# Patient Record
Sex: FEMALE | Race: WHITE | URBAN - METROPOLITAN AREA
[De-identification: names, ages, dates, MRNs, and addresses within clinical notes are randomized per-mention and may not be internally consistent; named-entity substitution may affect disease eponyms.]

---

## 2017-10-06 ENCOUNTER — OFFICE VISIT (OUTPATIENT)
Dept: FAMILY MEDICINE | Facility: CLINIC | Age: 37
End: 2017-10-06
Payer: COMMERCIAL

## 2017-10-06 VITALS
OXYGEN SATURATION: 99 % | SYSTOLIC BLOOD PRESSURE: 106 MMHG | DIASTOLIC BLOOD PRESSURE: 62 MMHG | BODY MASS INDEX: 23.98 KG/M2 | WEIGHT: 127 LBS | HEART RATE: 62 BPM | HEIGHT: 61 IN | TEMPERATURE: 98 F

## 2017-10-06 DIAGNOSIS — G89.29 CHRONIC PAIN OF BOTH SHOULDERS: Primary | ICD-10-CM

## 2017-10-06 DIAGNOSIS — M25.562 ACUTE PAIN OF BOTH KNEES: ICD-10-CM

## 2017-10-06 DIAGNOSIS — M25.561 ACUTE PAIN OF BOTH KNEES: ICD-10-CM

## 2017-10-06 DIAGNOSIS — M25.512 CHRONIC PAIN OF BOTH SHOULDERS: Primary | ICD-10-CM

## 2017-10-06 DIAGNOSIS — M25.511 CHRONIC PAIN OF BOTH SHOULDERS: Primary | ICD-10-CM

## 2017-10-06 PROCEDURE — 99203 OFFICE O/P NEW LOW 30 MIN: CPT | Performed by: PHYSICIAN ASSISTANT

## 2017-10-06 NOTE — PROGRESS NOTES
SUBJECTIVE:   Beatrice Cadena is a 37 year old female who presents to clinic today for the following health issues:    Accompanied by .    Joint Pain    Onset: for about three weeks    Description:   Location: bilateral knee pain  Character: feels like nails - notices around anterior infrapatellar tendon and around her kneecap    Intensity: at rest rates pain as 3/10. Will increase to 6/10 - had this more at the beginning of her exercise routine though and now has improved some.    Progression of Symptoms: same    Accompanying Signs & Symptoms:  Other symptoms: none - no swelling or ecchymosis.    History:   Previous similar pain: no       Precipitating factors:   Trauma or overuse: no trauma. Did start a new exercise routine about 1 month ago where she spent a whole week doing new exercises. Then due to her knee pain she stopped exercising for 2 weeks to see if it would help. Was doing squats and crunches.    Alleviating factors:  Improved by: nothing    Therapies Tried and outcome: none    2) Also reports she has had shoulder pain >1 yr and wonders if this would be related to her knee pain. Sometimes when she wakes up they hurt. Initially bothered her only once per month or every 2 weeks. Then now her shoulders hurt almost all the time over the past 3 months. Rates pain 8/10 sometimes when it wakens her from sleep. Is about 6/10 otherwise. No shoulder injuries. Insidious onset. R-handed. L usually bothers her more. 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 A lot of repetitive activities like this and vacuuming. No radicular symptoms.     Fhx: no rheumatoid arthritis or lupus      Problem list and histories reviewed & adjusted, as indicated.  Additional history: as documented    Patient Active Problem List   Diagnosis     CARDIOVASCULAR SCREENING; LDL GOAL LESS THAN 160     Past Surgical History:   Procedure Laterality Date     C EYE SURG ANT SGMT PROC  "UNLISTED       C I&D,THYROGLOSSAL CYST,INFECTED         Social History   Substance Use Topics     Smoking status: Never Smoker     Smokeless tobacco: Never Used     Alcohol use Yes     History reviewed. No pertinent family history.          Reviewed and updated as needed this visit by clinical staffTobacco  Allergies  Meds  Med Hx  Surg Hx  Fam Hx  Soc Hx      Reviewed and updated as needed this visit by Provider  Tobacco  Allergies  Meds  Med Hx  Surg Hx  Fam Hx  Soc Hx        ROS:  Constitutional, MSK, integumentary, neuro systems are negative, except as otherwise noted.      OBJECTIVE:   /62  Pulse 62  Temp 98  F (36.7  C) (Oral)  Ht 5' 0.5\" (1.537 m)  Wt 127 lb (57.6 kg)  SpO2 99%  Breastfeeding? No  BMI 24.39 kg/m2  Body mass index is 24.39 kg/(m^2).  GENERAL: healthy, alert and no distress  MS: ambulates with non-antalgic gait. Can perform deep knee squat with a little bit of discomfort L>R. No patellar, medial/lateral joint line or patellar grind pain. Negative Westley and anterior drawer testing excluding Westley caused a little medial pain on the L.   Starts to have L shoulder pain in flexion and abduction above the horizontal. Draws over acromion/deltoid region as area where pain occurs. FROM of both, but has pain with L push off testing and external rotation (a little). 5/5 strength to resistance testing of UE. Negative empty can testing. Equal and symmetric  strength.  Neuro: normal 2+ UE reflexes.     Diagnostic Test Results:  none     ASSESSMENT/PLAN:       ICD-10-CM    1. Chronic pain of both shoulders M25.511 PHYSICAL THERAPY REFERRAL    G89.29     M25.512    2. Acute pain of both knees M25.561 PHYSICAL THERAPY REFERRAL    M25.562    Pt declines further labs as noted in pt instructions below.  Did mention at the end of our appt after I had printed AVS she had shoulder imaging done by the chiropractor in the past and was told she may have some early arthritis.   Will " still continue with plan as below given good ROM and strength at this time, but certainly if worsening or not improving we can always consider repeat imaging or ortho consult.    A total of 40 minutes was spent with the patient today, with greater than 50% of the visit involving counseling and coordination of care regarding concerns of bilateral knee and shoulder pain. Visit complicated by use of .     See Patient Instructions  Patient Instructions   History/exam most suggestive of over-use type of problems with shoulder tendonitis and possible patellofemoral syndrome of her knees.  Can always treat with rest, ice and anti-inflammatories. Risks reviewed.  Would also likely benefit from further treatment with physical therapy.  Discussed potential for other autoimmune or rheumatoid arthritis given multiple joint involvement, but this seems less likely given area of involvement.  Can always consider labs and/or imaging if she doesn't improve or has on-going concerns.    Electronically Signed By: Avis De León PA-C

## 2017-10-06 NOTE — NURSING NOTE
"Chief Complaint   Patient presents with     Knee Pain       Initial Pulse 62  Temp 98  F (36.7  C) (Oral)  Ht 5' 0.5\" (1.537 m)  Wt 127 lb (57.6 kg)  SpO2 99%  Breastfeeding? No  BMI 24.39 kg/m2 Estimated body mass index is 24.39 kg/(m^2) as calculated from the following:    Height as of this encounter: 5' 0.5\" (1.537 m).    Weight as of this encounter: 127 lb (57.6 kg).  Medication Reconciliation: complete  "

## 2017-10-06 NOTE — PATIENT INSTRUCTIONS
History/exam most suggestive of over-use type of problems with shoulder tendonitis and possible patellofemoral syndrome of her knees.  Can always treat with rest, ice and anti-inflammatories. Risks reviewed.  Would also likely benefit from further treatment with physical therapy.  Discussed potential for other autoimmune or rheumatoid arthritis given multiple joint involvement, but this seems less likely given area of involvement.  Can always consider labs and/or imaging if she doesn't improve or has on-going concerns.    Electronically Signed By: Avis De León PA-C

## 2017-10-08 ENCOUNTER — HEALTH MAINTENANCE LETTER (OUTPATIENT)
Age: 37
End: 2017-10-08

## 2018-03-02 ENCOUNTER — OFFICE VISIT (OUTPATIENT)
Dept: FAMILY MEDICINE | Facility: CLINIC | Age: 38
End: 2018-03-02
Payer: COMMERCIAL

## 2018-03-02 VITALS
HEIGHT: 61 IN | TEMPERATURE: 98.6 F | HEART RATE: 58 BPM | BODY MASS INDEX: 24.92 KG/M2 | WEIGHT: 132 LBS | OXYGEN SATURATION: 100 % | DIASTOLIC BLOOD PRESSURE: 64 MMHG | SYSTOLIC BLOOD PRESSURE: 90 MMHG

## 2018-03-02 DIAGNOSIS — H10.33 ACUTE CONJUNCTIVITIS OF BOTH EYES, UNSPECIFIED ACUTE CONJUNCTIVITIS TYPE: Primary | ICD-10-CM

## 2018-03-02 PROCEDURE — 99213 OFFICE O/P EST LOW 20 MIN: CPT | Performed by: FAMILY MEDICINE

## 2018-03-02 RX ORDER — POLYMYXIN B SULFATE AND TRIMETHOPRIM 1; 10000 MG/ML; [USP'U]/ML
1 SOLUTION OPHTHALMIC 4 TIMES DAILY
Qty: 2 ML | Refills: 0 | Status: SHIPPED | OUTPATIENT
Start: 2018-03-02 | End: 2018-03-09

## 2018-03-02 NOTE — NURSING NOTE
"Chief Complaint   Patient presents with     Eye Problem       Initial BP 90/64  Pulse 58  Temp 98.6  F (37  C) (Oral)  Ht 5' 0.5\" (1.537 m)  Wt 132 lb (59.9 kg)  LMP 02/28/2018  SpO2 100%  BMI 25.36 kg/m2 Estimated body mass index is 25.36 kg/(m^2) as calculated from the following:    Height as of this encounter: 5' 0.5\" (1.537 m).    Weight as of this encounter: 132 lb (59.9 kg).  Medication Reconciliation: complete   Rochelle Palma Certified Medical Assistant    "

## 2018-03-02 NOTE — PROGRESS NOTES
"  SUBJECTIVE:   Beatrice Cadena is a 37 year old female who presents to clinic today for the following health issues:      Eye(s) Problem  Onset: X1 Week     Description:   Location: bilateral - very itchy   Pain: YES- little   Redness: YES    Accompanying Signs & Symptoms:  Discharge/mattering: YES  Swelling: YES  Visual changes: no   Fever: no  Nasal Congestion: no  Bothered by bright lights: YES- little     History:   Trauma: no   Foreign body exposure: no     Precipitating factors:   Wearing contacts: no    Alleviating factors:  Improved by: nothing     Therapies Tried and outcome: water and eye drops for allergies - maybe helped a little bit.       Problem list and histories reviewed & adjusted, as indicated.  Additional history: as documented    Patient Active Problem List   Diagnosis     CARDIOVASCULAR SCREENING; LDL GOAL LESS THAN 160     Past Surgical History:   Procedure Laterality Date     C EYE SURG ANT SGMT PROC UNLISTED       C I&D,THYROGLOSSAL CYST,INFECTED         Social History   Substance Use Topics     Smoking status: Never Smoker     Smokeless tobacco: Never Used     Alcohol use Yes     History reviewed. No pertinent family history.        Reviewed and updated as needed this visit by clinical staff  Tobacco  Allergies  Meds  Problems  Med Hx  Surg Hx  Fam Hx  Soc Hx        Reviewed and updated as needed this visit by Provider  Allergies  Meds  Problems         ROS:  Constitutional, HEENT, cardiovascular, pulmonary, gi and gu systems are negative, except as otherwise noted.    OBJECTIVE:     BP 90/64  Pulse 58  Temp 98.6  F (37  C) (Oral)  Ht 5' 0.5\" (1.537 m)  Wt 132 lb (59.9 kg)  LMP 02/28/2018  SpO2 100%  BMI 25.36 kg/m2  Body mass index is 25.36 kg/(m^2).  GENERAL: healthy, alert and no distress  EYES: thin discharge seen in eyelashes; pink conjunctiva    Diagnostic Test Results:  none     ASSESSMENT/PLAN:   1. Acute conjunctivitis of both eyes, unspecified acute " conjunctivitis type: will treat for acute conjunctivitis with Polytrim eye drops. Return to clinic if symptoms not improving.  - trimethoprim-polymyxin b (POLYTRIM) ophthalmic solution; Place 1 drop into both eyes 4 times daily for 7 days  Dispense: 2 mL; Refill: 0    Vipul Lamb DO  [facility]

## 2018-03-02 NOTE — MR AVS SNAPSHOT
"              After Visit Summary   3/2/2018    Beatrice Cadena    MRN: [id number]           Patient Information     Date Of Birth          1980        Visit Information        Provider Department      3/2/2018 1:05 PM Vipul Lamb, DO; [facility]        Today's Diagnoses     Acute conjunctivitis of both eyes, unspecified acute conjunctivitis type    -  1       Follow-ups after your visit        Follow-up notes from your care team     Return in about 1 week (around 3/9/2018), or if symptoms worsen or fail to improve.      Who to contact     If you have questions or need follow up information about today's clinic visit or your schedule please contact [facility] directly at [phone].  Normal or non-critical lab and imaging results will be communicated to you by MyChart, letter or phone within 4 business days after the clinic has received the results. If you do not hear from us within 7 days, please contact the clinic through MyChart or phone. If you have a critical or abnormal lab result, we will notify you by phone as soon as possible.  Submit refill requests through [employer] or call your pharmacy and they will forward the refill request to us. Please allow 3 business days for your refill to be completed.          Additional Information About Your Visit        MyChart Information     [employer] lets you send messages to your doctor, view your test results, renew your prescriptions, schedule appointments and more. To sign up, go to [email or web address] . Click on \"Log in\" on the left side of the screen, which will take you to the Welcome page. Then click on \"Sign up Now\" on the right side of the page.     You will be asked to enter the access code listed below, as well as some personal information. Please follow the directions to create your username and password.     Your access code is: [email or web address]  Expires: 5/31/2018  1:50 PM     Your access code " "will  in 90 days. If you need help or a new code, please call your [place] clinic or [phone].        Care EveryWhere ID     This is your Care EveryWhere ID. This could be used by other organizations to access your [place] medical records  [id number]        Your Vitals Were     Pulse Temperature Height Last Period Pulse Oximetry BMI (Body Mass Index)    58 98.6  F (37  C) (Oral) 5' 0.5\" (1.537 m) 2018 100% 25.36 kg/m2       Blood Pressure from Last 3 Encounters:   18 90/64   10/06/17 106/62    Weight from Last 3 Encounters:   18 132 lb (59.9 kg)   10/06/17 127 lb (57.6 kg)              Today, you had the following     No orders found for display         Today's Medication Changes          These changes are accurate as of 3/2/18  1:50 PM.  If you have any questions, ask your nurse or doctor.               Start taking these medicines.        Dose/Directions    trimethoprim-polymyxin b ophthalmic solution   Commonly known as:  POLYTRIM   Used for:  Acute conjunctivitis of both eyes, unspecified acute conjunctivitis type   Started by:  Vipul Lamb,         Dose:  1 drop   Place 1 drop into both eyes 4 times daily for 7 days   Quantity:  2 mL   Refills:  0            Where to get your medicines      These medications were sent to [facility] [address] [place] [address] AT [facility] [address] [address]    Hours:  24-hours Phone:  [phone]     trimethoprim-polymyxin b ophthalmic solution                Primary Care Provider Office Phone # Fax #    Gurpreet Genao [phone]       [address] [facility] [address]        Equal Access to Services     QUIRINO KEARNEY AH: Tamera Keller, alee davis, qaana kaalmada trenada, bishnu saleh. So [facility] [phone].    ATENCIÓN: Si habla español, tiene a cox disposición servicios gratuitos de " verónica lingüísticsahil. Michaela al [phone].    We comply with applicable federal civil rights laws and [place] laws. We do not discriminate on the basis of race, color, national origin, age, disability, sex, sexual orientation, or gender identity.            Thank you!     Thank you for choosing [facility]  for your care. Our goal is always to provide you with excellent care. Hearing back from our patients is one way we can continue to improve our services. Please take a few minutes to complete the written survey that you may receive in the mail after your visit with us. Thank you!             Your Updated Medication List - Protect others around you: Learn how to safely use, store and throw away your medicines at www.disposemymeds.org.          This list is accurate as of 3/2/18  1:50 PM.  Always use your most recent med list.                   Brand Name Dispense Instructions for use Diagnosis    trimethoprim-polymyxin b ophthalmic solution    POLYTRIM    2 mL    Place 1 drop into both eyes 4 times daily for 7 days    Acute conjunctivitis of both eyes, unspecified acute conjunctivitis type

## 2018-03-12 ENCOUNTER — OFFICE VISIT (OUTPATIENT)
Dept: PODIATRY | Facility: CLINIC | Age: 38
End: 2018-03-12
Payer: COMMERCIAL

## 2018-03-12 VITALS
DIASTOLIC BLOOD PRESSURE: 64 MMHG | WEIGHT: 132 LBS | HEIGHT: 61 IN | SYSTOLIC BLOOD PRESSURE: 96 MMHG | BODY MASS INDEX: 24.92 KG/M2

## 2018-03-12 DIAGNOSIS — B35.3 TINEA PEDIS OF BOTH FEET: ICD-10-CM

## 2018-03-12 DIAGNOSIS — B35.1 ONYCHOMYCOSIS OF TOENAIL: Primary | ICD-10-CM

## 2018-03-12 LAB
ALBUMIN SERPL-MCNC: 3.7 G/DL (ref 3.4–5)
ALP SERPL-CCNC: 54 U/L (ref 40–150)
ALT SERPL W P-5'-P-CCNC: 21 U/L (ref 0–50)
AST SERPL W P-5'-P-CCNC: 25 U/L (ref 0–45)
BILIRUB DIRECT SERPL-MCNC: <0.1 MG/DL (ref 0–0.2)
BILIRUB SERPL-MCNC: 0.3 MG/DL (ref 0.2–1.3)
PROT SERPL-MCNC: 7.4 G/DL (ref 6.8–8.8)

## 2018-03-12 PROCEDURE — 80076 HEPATIC FUNCTION PANEL: CPT | Performed by: PODIATRIST

## 2018-03-12 PROCEDURE — 99203 OFFICE O/P NEW LOW 30 MIN: CPT | Performed by: PODIATRIST

## 2018-03-12 PROCEDURE — 36415 COLL VENOUS BLD VENIPUNCTURE: CPT | Performed by: PODIATRIST

## 2018-03-12 RX ORDER — TERBINAFINE HYDROCHLORIDE 250 MG/1
250 TABLET ORAL DAILY
Qty: 90 TABLET | Refills: 0 | Status: SHIPPED | OUTPATIENT
Start: 2018-03-12 | End: 2018-06-11

## 2018-03-12 RX ORDER — KETOCONAZOLE 20 MG/G
CREAM TOPICAL DAILY
Qty: 60 G | Refills: 2 | Status: SHIPPED | OUTPATIENT
Start: 2018-03-12 | End: 2018-06-21

## 2018-03-12 NOTE — LETTER
"    3/12/2018         RE: Beatrice Cadena  [address] [place] [id number]        Dear Colleague,    Thank you for referring your patient, Beatrice Cadena, to the [facility] ([facility] Please see a copy of my visit note below.    PATIENT HISTORY:  Beatrice Cadena is a 38 year old female who presents to clinic for thickened darkened toenails. Here with . Notes that it has been going on for 5 years. Will get red patches that itch on the foot too. Denies pain. Would like to knw what can be done to get rid of the nail fungus.     Review of Systems:  Patient denies fever, chills, rash, wound, stiffness, limping, numbness, weakness, heart burn, blood in stool, chest pain with activity, calf pain when walking, shortness of breath with activity, chronic cough, easy bleeding/bruising, swelling of ankles, excessive thirst, fatigue, depression, anxiety.       PAST MEDICAL HISTORY: No past medical history on file.     PAST SURGICAL HISTORY:   Past Surgical History:   Procedure Laterality Date     C EYE SURG ANT SGMT PROC UNLISTED       C I&D,THYROGLOSSAL CYST,INFECTED          MEDICATIONS: No current outpatient prescriptions on file.     ALLERGIES:  No Known Allergies     SOCIAL HISTORY:   Social History     Social History     Marital status:      Spouse name: N/A     Number of children: N/A     Years of education: N/A     Occupational History     Not on file.     Social History Main Topics     Smoking status: Never Smoker     Smokeless tobacco: Never Used     Alcohol use Yes     Drug use: No     Sexual activity: Yes     Partners: Male     Other Topics Concern     Not on file     Social History Narrative        FAMILY HISTORY: No family history on file.     EXAM:Vitals:BP 96/64  Ht 5' 0.5\" (1.537 m)  Wt 132 lb (59.9 kg)  LMP 02/28/2018  BMI 25.36 kg/m2    General appearance: Patient is alert and fully cooperative with history & exam.  No sign of distress is " noted during the visit.     Psychiatric: Affect is pleasant & appropriate.  Patient appears motivated to improve health.     Respiratory: Breathing is regular & unlabored while sitting.     HEENT: Hearing is intact to spoken word.  Speech is clear.  No gross evidence of visual impairment that would impact ambulation.     Dermatologic: Skin is intact to both lower extremities without significant lesions, rash or abrasion.  No paronychia or evidence of soft tissue infection is noted.     Vascular: DP & PT pulses are intact & regular bilaterally.  No significant edema or varicosities noted.  CFT and skin temperature is normal to both lower extremities.     Neurologic: Lower extremity sensation is intact to light touch.  No evidence of weakness or contracture in the lower extremities.  No evidence of neuropathy.     Musculoskeletal: Patient is ambulatory without assistive device or brace.  No gross ankle deformity noted.  No foot or ankle joint effusion is noted.     ASSESSMENT:    Onychomycosis of toenail  Tinea pedis of both feet     PLAN:  Reviewed patient's chart in epic. Discussed causes and treatments of nail fungus.  Explained that even if a culture comes back negative, a patient could still have nail fungus.  Discussed treatment options with patient and explained that there isn't one treatment that is 100% effective.  Discussed oral lamisil which is the most effective at about 70% but which can have liver effects.  Explained that if she wanted to try this that she would need serial blood draws to test her liver function.  Discussed over the counter antifungal creams.  Explained that these are about 50% effective and need to be applied twice a day for about 3-4 months.  Also talked about prescription penlac which is a nail laquer.  Again this is also only 50% effective.  Also discussed that if there was damage to the nail and the nail is now dystrophic that non of the above is going to change the nail.  If there  was damage, there is note anything that can be done for the nail to correct it.  Discussed that if it becomes painful, we can remove the nail in clinic.        Talked about athletes foot. Recommend oral and topical antifungals. Will do liver tests but patient is not currently on medications.           Lyly Tineo DPM, Podiatry/Foot and Ankle Surgery    Weight management plan: Patient was referred to their PCP to discuss a diet and exercise plan.      Again, thank you for allowing me to participate in the care of your patient.        Sincerely,        Lyly Tineo DPM, Podiatry/Foot and Ankle Surgery

## 2018-03-12 NOTE — MR AVS SNAPSHOT
After Visit Summary   3/12/2018    Beatrice Cadena    MRN: [id number]           Patient Information     Date Of Birth          1980        Visit Information        Provider Department      3/12/2018 11:00 AM Lyly Tineo DPM, Podiatry/Foot and Ankle Surgery; [employer] [facility]        Care Instructions    Thank you for choosing [facility]!    DR. TINEO'S CLINIC LOCATIONS:   MONDAY AM - [place] TUESDAY - [place]   [address] [address] [address] [place] [address] [place] [address]   [phone] / [phone] / [phone]       WEDNESDAY - [place] FRIDAY PM - [place]   [address]   [place] [address] [place], [place] [address] [phone] / [phone]       SCHEDULE SURGERY: [phone]    APPOINTMENTS: [phone]    BILLING QUESTIONS: [phone]      NAIL FUNGUS / ONYCHOMYCOSIS   Nail fungus is not a hygiene problem and will not likely lead to significant medical   problems. The nails may get thick causing pain and possibly local skin infection.   Treatments include debridement (trimming), oral antifungals, topical antifungals and complete removal of the nail. Most fungal nails are not treated.   Topicals such as tea tree oil can be helpful for surface fungus and may, at best, limit   progression. Over the counter creams (such as Lamisil) can also be used however, their effectiveness is also quite low.  Topical treatment with Pen lac is expensive and often not covered by insurance. Pen lac has an approximate 8% success rate. Topical therapy recommendations is to apply twice a day for at least 3-4 months as it takes 9 months for new nail to grow out.    Experts suggest soaking your feet for 15 to 20 minutes in a mixture of 1 cup vinegar to 4 cups warm water. Be sure to rinse well and pat your feet dry when you're  done. You can soak your feet like this daily. But if your skin becomes irritated, try soaking only two to three times a week. Vicks VapoRub, as with vinegar, there have been no controlled clinical trials to assess the effectiveness of Vicks VapoRub on nail fungus, but there have been numerous anecdotal reports that it works. There's no consensus on how often to apply this product, so check with your doctor before using it on your nails.     Oral therapies include Sporanox and Lamisil. Oral therapies are also expensive and not very effective. Side effects such as liver disease are the main concern. Return of fungus is common even if the treatment worked.     Other Tips:  - Penlac nail medication apply daily x 4 months; remove old polish first day of each week  - Antifungal cream/powder (Zeasorb) - apply daily to feet and shoes x 2 months  - Clean shoes with Lysol or in washing machine every few weeks  - Rotate shoe gear; give them 24 hours to dry out between days wearing them  - Clean pair of socks in morning, clean pair in afternoon if your feet sweat  - Shower shoes used in public showers/pools  ATHLETE'S FOOT (TINEA PEDIS)  It is a rash that is caused by a fungus (most commonly Dermatophytes) in the outer layer of skin on the foot or in the nails. It can occur between the toes or on the instep and heel areas of the feet. Fungus will grow in warm and moist environments. The fungus that causes athlete's foot is contagious and you can get it from touching someone who has it of walking around bare foot around swimming pools, gyms, saunas, communal baths and showers, fitness centers or locker rooms.     SYMPTOMS  The symptoms of athlete's foot are numerous and include; itching, burning or stinging between the toes or on the soles of the feet, blisters, cracked and peeling skin, excessive dryness or excessive scaling on the bottom or sides of the feet, redness and softness with breaking down of the skin, especially  between the toes, foot odor and thick, crumbly nails. Older males or people who live in warm humid environments are more prone to get it but it can develop at any age.    TREATMENT OPTIONS  Typically it can be treated with antifungal creams, lotions, ointments, sprays, or gels.  Many are available over the counter, some are prescription. It is important that you use them long enough, typically 4 weeks, to clear the rash. If an infection is particularly bad, your provider may prescribe oral medication. It is important that you follow the directions for any medication carefully to prevent recurrence of the rash.    HOME TREATMENT  1.  Keep feet clean and dry  2.  Dry between your toes any time your feet become wet  3.  Wear socks that are made of cotton, wool, or fibers designed to wick moisture away  from skin. Nylon, lycra, and spandex tend to trap moisture.  4.  If you have blistering, you may soak your feet in Burow's solution (aluminum acetate is active ingredient. Domeboro is a brand sold at [employer]). This is available over the counter.  5.  Treat shows with antifungal powder  6.  Tea Tree oil may help reduce symptoms but does not cure the rash.    PREVENTION  1.  Change socks or stockings regularly.  2.  Use talcum powder on your feet if they sweat a lot.  3.  Do not borrow shoes.  4.  Let shoes dry out for 24 hours before wearing them again.  5.  Treat shoes with fungal powder  6.  Wear shoes that are well ventilated such as leather shoes or sandals.  Avoid shoes  made of synthetic materials such as vinyl or rubber.  7.  Wear water proof sandals when you are in public areas such as locker rooms, pools, communal baths, showers, saunas, and fitness centers.    FYI: Call or seek medical attention IMMEDIATELY if:  - You develop a fever over 100.4F for more than 24 hrs.  - There is a discharge of pus from infected areas.  - Red streaks develop from the affected areas  - Increase in redness, warmth, pain,  swelling, or tenderness in the affected areas.      Body Mass Index (BMI)  Many things can cause foot and ankle problems. Foot structure, activity level, foot mechanics and injuries are common causes of pain.  One very important issue that often goes unmentioned, is body weight. Extra weight can cause increased stress on muscles, ligaments, bones and tendons.  Sometimes just a few extra pounds is all it takes to put one over her/his threshold. Without reducing that stress, it can be difficult to alleviate pain. Some people are uncomfortable addressing this issue, but we feel it is important for you to think about it. As Foot &  Ankle specialists, our job is addressing the lower extremity problem and possible causes. Regarding extra body weight, we encourage patients to discuss diet and weight management plans with their primary care doctors. It is this team approach that gives you the best opportunity for pain relief and getting you back on your feet.                  Follow-ups after your visit        Who to contact     If you have questions or need follow up information about today's clinic visit or your schedule please contact [facility] [place] directly at [phone].  Normal or non-critical lab and imaging results will be communicated to you by Babyoyehart, letter or phone within 4 business days after the clinic has received the results. If you do not hear from us within 7 days, please contact the clinic through Convercentt or phone. If you have a critical or abnormal lab result, we will notify you by phone as soon as possible.  Submit refill requests through Nurigene or call your pharmacy and they will forward the refill request to us. Please allow 3 business days for your refill to be completed.          Additional Information About Your Visit        Nurigene Information     Nurigene lets you send messages to your doctor, view your test results, renew your prescriptions, schedule appointments and more. To sign  "up, go to [email or web address] . Click on \"Log in\" on the left side of the screen, which will take you to the Welcome page. Then click on \"Sign up Now\" on the right side of the page.     You will be asked to enter the access code listed below, as well as some personal information. Please follow the directions to create your username and password.     Your access code is: [email or web address]  Expires: 2018  2:50 PM     Your access code will  in 90 days. If you need help or a new code, please call your [facility] clinic or [phone].        Care EveryWhere ID     This is your Care EveryWhere ID. This could be used by other organizations to access your [facility] medical records  [id number]        Your Vitals Were     Height Last Period BMI (Body Mass Index)             5' 0.5\" (1.537 m) 2018 25.36 kg/m2          Blood Pressure from Last 3 Encounters:   18 96/64   18 90/64   10/06/17 106/62    Weight from Last 3 Encounters:   18 132 lb (59.9 kg)   18 132 lb (59.9 kg)   10/06/17 127 lb (57.6 kg)              Today, you had the following     No orders found for display       Primary Care Provider Fax #    Physician No Ref-Primary [phone]       No address on file        Equal Access to Services     [facility] CAIO : Hadii russell suttono Sosylvesterali, waaxda luqadaha, qaybta kaalmada adeegyada, bishnu vences . So [facility] [phone].    ATENCIÓN: Si habla español, tiene a cox disposición servicios gratuitos de asistencia lingüística. Llame al [phone].    We comply with applicable federal civil rights laws and [place] laws. We do not discriminate on the basis of race, color, national origin, age, disability, sex, sexual orientation, or gender identity.            Thank you!     Thank you for choosing [facility] [place]  for your care. Our goal is always to provide you with excellent care. Hearing back from our patients is one way we can continue to " improve our services. Please take a few minutes to complete the written survey that you may receive in the mail after your visit with us. Thank you!             Your Updated Medication List - Protect others around you: Learn how to safely use, store and throw away your medicines at www.disposemymeds.org.      Notice  As of 3/12/2018 11:44 AM    You have not been prescribed any medications.

## 2018-03-12 NOTE — NURSING NOTE
"Chief Complaint   Patient presents with     Toenail     pt worried most nails are discolored and getting thivker        Initial BP 96/64  Ht 5' 0.5\" (1.537 m)  Wt 132 lb (59.9 kg)  LMP 02/28/2018  BMI 25.36 kg/m2 Estimated body mass index is 25.36 kg/(m^2) as calculated from the following:    Height as of this encounter: 5' 0.5\" (1.537 m).    Weight as of this encounter: 132 lb (59.9 kg).  Medication Reconciliation: complete   Al Luz MA      "

## 2018-03-12 NOTE — PROGRESS NOTES
"PATIENT HISTORY:  Beatrice Cadena is a 38 year old female who presents to clinic for thickened darkened toenails. Here with . Notes that it has been going on for 5 years. Will get red patches that itch on the foot too. Denies pain. Would like to knw what can be done to get rid of the nail fungus.     Review of Systems:  Patient denies fever, chills, rash, wound, stiffness, limping, numbness, weakness, heart burn, blood in stool, chest pain with activity, calf pain when walking, shortness of breath with activity, chronic cough, easy bleeding/bruising, swelling of ankles, excessive thirst, fatigue, depression, anxiety.       PAST MEDICAL HISTORY: No past medical history on file.     PAST SURGICAL HISTORY:   Past Surgical History:   Procedure Laterality Date     C EYE SURG ANT SGMT PROC UNLISTED       C I&D,THYROGLOSSAL CYST,INFECTED          MEDICATIONS: No current outpatient prescriptions on file.     ALLERGIES:  No Known Allergies     SOCIAL HISTORY:   Social History     Social History     Marital status:      Spouse name: N/A     Number of children: N/A     Years of education: N/A     Occupational History     Not on file.     Social History Main Topics     Smoking status: Never Smoker     Smokeless tobacco: Never Used     Alcohol use Yes     Drug use: No     Sexual activity: Yes     Partners: Male     Other Topics Concern     Not on file     Social History Narrative        FAMILY HISTORY: No family history on file.     EXAM:Vitals:BP 96/64  Ht 5' 0.5\" (1.537 m)  Wt 132 lb (59.9 kg)  LMP 02/28/2018  BMI 25.36 kg/m2    General appearance: Patient is alert and fully cooperative with history & exam.  No sign of distress is noted during the visit.     Psychiatric: Affect is pleasant & appropriate.  Patient appears motivated to improve health.     Respiratory: Breathing is regular & unlabored while sitting.     HEENT: Hearing is intact to spoken word.  Speech is clear.  No gross evidence " of visual impairment that would impact ambulation.     Dermatologic: Skin is intact to both lower extremities without significant lesions, rash or abrasion.  No paronychia or evidence of soft tissue infection is noted.     Vascular: DP & PT pulses are intact & regular bilaterally.  No significant edema or varicosities noted.  CFT and skin temperature is normal to both lower extremities.     Neurologic: Lower extremity sensation is intact to light touch.  No evidence of weakness or contracture in the lower extremities.  No evidence of neuropathy.     Musculoskeletal: Patient is ambulatory without assistive device or brace.  No gross ankle deformity noted.  No foot or ankle joint effusion is noted.     ASSESSMENT:    Onychomycosis of toenail  Tinea pedis of both feet     PLAN:  Reviewed patient's chart in epic. Discussed causes and treatments of nail fungus.  Explained that even if a culture comes back negative, a patient could still have nail fungus.  Discussed treatment options with patient and explained that there isn't one treatment that is 100% effective.  Discussed oral lamisil which is the most effective at about 70% but which can have liver effects.  Explained that if she wanted to try this that she would need serial blood draws to test her liver function.  Discussed over the counter antifungal creams.  Explained that these are about 50% effective and need to be applied twice a day for about 3-4 months.  Also talked about prescription penlac which is a nail laquer.  Again this is also only 50% effective.  Also discussed that if there was damage to the nail and the nail is now dystrophic that non of the above is going to change the nail.  If there was damage, there is note anything that can be done for the nail to correct it.  Discussed that if it becomes painful, we can remove the nail in clinic.        Talked about athletes foot. Recommend oral and topical antifungals. Will do liver tests but patient is not  currently on medications.           Lyly Tineo DPM, Podiatry/Foot and Ankle Surgery    Weight management plan: Patient was referred to their PCP to discuss a diet and exercise plan.

## 2018-03-12 NOTE — PATIENT INSTRUCTIONS
Thank you for choosing [facility]!    DR. GATES'S CLINIC LOCATIONS:   MONDAY AM - [place] TUESDAY - [place]   [address] [address] [address] [address] [place], [place] [id number] [phone] / [phone]       WEDNESDAY - [place] FRIDAY PM - [place]   [address]   [address] [address] [place] [address] [phone] / [phone]       SCHEDULE SURGERY: [phone]    APPOINTMENTS: [phone]    BILLING QUESTIONS: [phone]      NAIL FUNGUS / ONYCHOMYCOSIS   Nail fungus is not a hygiene problem and will not likely lead to significant medical   problems. The nails may get thick causing pain and possibly local skin infection.   Treatments include debridement (trimming), oral antifungals, topical antifungals and complete removal of the nail. Most fungal nails are not treated.   Topicals such as tea tree oil can be helpful for surface fungus and may, at best, limit   progression. Over the counter creams (such as Lamisil) can also be used however, their effectiveness is also quite low.  Topical treatment with Pen lac is expensive and often not covered by insurance. Pen lac has an approximate 8% success rate. Topical therapy recommendations is to apply twice a day for at least 3-4 months as it takes 9 months for new nail to grow out.    Experts suggest soaking your feet for 15 to 20 minutes in a mixture of 1 cup vinegar to 4 cups warm water. Be sure to rinse well and pat your feet dry when you're done. You can soak your feet like this daily. But if your skin becomes irritated, try soaking only two to three times a week. Vicks VapoRub, as with vinegar, there have been no controlled clinical trials to assess the effectiveness of Vicks VapoRub on nail fungus, but there have been numerous anecdotal reports that it works. There's no consensus on how often to apply  this product, so check with your doctor before using it on your nails.     Oral therapies include Sporanox and Lamisil. Oral therapies are also expensive and not very effective. Side effects such as liver disease are the main concern. Return of fungus is common even if the treatment worked.     Other Tips:  - Penlac nail medication apply daily x 4 months; remove old polish first day of each week  - Antifungal cream/powder (Zeasorb)   apply daily to feet and shoes x 2 months  - Clean shoes with Lysol or in washing machine every few weeks  - Rotate shoe gear; give them 24 hours to dry out between days wearing them  - Clean pair of socks in morning, clean pair in afternoon if your feet sweat  - Shower shoes used in public showers/pools  ATHLETE'S FOOT (TINEA PEDIS)  It is a rash that is caused by a fungus (most commonly Dermatophytes) in the outer layer of skin on the foot or in the nails. It can occur between the toes or on the instep and heel areas of the feet. Fungus will grow in warm and moist environments. The fungus that causes athlete's foot is contagious and you can get it from touching someone who has it of walking around bare foot around swimming pools, gyms, saunas, communal baths and showers, fitness centers or locker rooms.     SYMPTOMS  The symptoms of athlete's foot are numerous and include; itching, burning or stinging between the toes or on the soles of the feet, blisters, cracked and peeling skin, excessive dryness or excessive scaling on the bottom or sides of the feet, redness and softness with breaking down of the skin, especially between the toes, foot odor and thick, crumbly nails. Older males or people who live in warm humid environments are more prone to get it but it can develop at any age.    TREATMENT OPTIONS  Typically it can be treated with antifungal creams, lotions, ointments, sprays, or gels.  Many are available over the counter, some are prescription. It is important that you use them  long enough, typically 4 weeks, to clear the rash. If an infection is particularly bad, your provider may prescribe oral medication. It is important that you follow the directions for any medication carefully to prevent recurrence of the rash.    HOME TREATMENT  1.  Keep feet clean and dry  2.  Dry between your toes any time your feet become wet  3.  Wear socks that are made of cotton, wool, or fibers designed to wick moisture away  from skin. Nylon, lycra, and spandex tend to trap moisture.  4.  If you have blistering, you may soak your feet in Burow's solution (aluminum acetate is active ingredient. Domeboro is a brand sold at [employer]). This is available over the counter.  5.  Treat shows with antifungal powder  6.  Tea Tree oil may help reduce symptoms but does not cure the rash.    PREVENTION  1.  Change socks or stockings regularly.  2.  Use talcum powder on your feet if they sweat a lot.  3.  Do not borrow shoes.  4.  Let shoes dry out for 24 hours before wearing them again.  5.  Treat shoes with fungal powder  6.  Wear shoes that are well ventilated such as leather shoes or sandals.  Avoid shoes  made of synthetic materials such as vinyl or rubber.  7.  Wear water proof sandals when you are in public areas such as locker rooms, pools, communal baths, showers, saunas, and fitness centers.    FYI: Call or seek medical attention IMMEDIATELY if:  - You develop a fever over 100.4F for more than 24 hrs.  - There is a discharge of pus from infected areas.  - Red streaks develop from the affected areas  - Increase in redness, warmth, pain, swelling, or tenderness in the affected areas.      Body Mass Index (BMI)  Many things can cause foot and ankle problems. Foot structure, activity level, foot mechanics and injuries are common causes of pain.  One very important issue that often goes unmentioned, is body weight. Extra weight can cause increased stress on muscles, ligaments, bones and tendons.  Sometimes just a  few extra pounds is all it takes to put one over her/his threshold. Without reducing that stress, it can be difficult to alleviate pain. Some people are uncomfortable addressing this issue, but we feel it is important for you to think about it. As Foot &  Ankle specialists, our job is addressing the lower extremity problem and possible causes. Regarding extra body weight, we encourage patients to discuss diet and weight management plans with their primary care doctors. It is this team approach that gives you the best opportunity for pain relief and getting you back on your feet.

## 2018-06-11 ENCOUNTER — OFFICE VISIT (OUTPATIENT)
Dept: FAMILY MEDICINE | Facility: CLINIC | Age: 38
End: 2018-06-11
Payer: COMMERCIAL

## 2018-06-11 VITALS
HEART RATE: 62 BPM | WEIGHT: 127 LBS | BODY MASS INDEX: 23.98 KG/M2 | SYSTOLIC BLOOD PRESSURE: 94 MMHG | TEMPERATURE: 98.2 F | DIASTOLIC BLOOD PRESSURE: 60 MMHG | HEIGHT: 61 IN | OXYGEN SATURATION: 99 %

## 2018-06-11 DIAGNOSIS — M25.50 PAIN IN JOINT, MULTIPLE SITES: Primary | ICD-10-CM

## 2018-06-11 LAB
CRP SERPL-MCNC: <2.9 MG/L (ref 0–8)
ERYTHROCYTE [DISTWIDTH] IN BLOOD BY AUTOMATED COUNT: 13 % (ref 10–15)
ERYTHROCYTE [SEDIMENTATION RATE] IN BLOOD BY WESTERGREN METHOD: 13 MM/H (ref 0–20)
HCT VFR BLD AUTO: 36.1 % (ref 35–47)
HGB BLD-MCNC: 12.1 G/DL (ref 11.7–15.7)
MCH RBC QN AUTO: 30.9 PG (ref 26.5–33)
MCHC RBC AUTO-ENTMCNC: 33.5 G/DL (ref 31.5–36.5)
MCV RBC AUTO: 92 FL (ref 78–100)
PLATELET # BLD AUTO: 174 10E9/L (ref 150–450)
RBC # BLD AUTO: 3.92 10E12/L (ref 3.8–5.2)
WBC # BLD AUTO: 5.8 10E9/L (ref 4–11)

## 2018-06-11 PROCEDURE — 99213 OFFICE O/P EST LOW 20 MIN: CPT | Performed by: FAMILY MEDICINE

## 2018-06-11 PROCEDURE — 82306 VITAMIN D 25 HYDROXY: CPT | Performed by: FAMILY MEDICINE

## 2018-06-11 PROCEDURE — 85652 RBC SED RATE AUTOMATED: CPT | Performed by: FAMILY MEDICINE

## 2018-06-11 PROCEDURE — 85027 COMPLETE CBC AUTOMATED: CPT | Performed by: FAMILY MEDICINE

## 2018-06-11 PROCEDURE — 86431 RHEUMATOID FACTOR QUANT: CPT | Performed by: FAMILY MEDICINE

## 2018-06-11 PROCEDURE — 36415 COLL VENOUS BLD VENIPUNCTURE: CPT | Performed by: FAMILY MEDICINE

## 2018-06-11 PROCEDURE — 86140 C-REACTIVE PROTEIN: CPT | Performed by: FAMILY MEDICINE

## 2018-06-11 NOTE — LETTER
Mica 15, 2018      Beatrice Cadena  [address] [address]        Dear Ms.Osornio Cadena,    We are writing to inform you of your test results.    -Normal red blood cell (hgb) levels, normal white blood cell count and normal platelet levels.   -Vitamin D level is low and oral supplementation should be started.  ADVISE: starting over the counter Vitamin D3  2000 IU - 3 tabs (6000 IU) daily for 6 weeks and then 2000 IU daily to maintain levels.  In 2 months, please schedule a lab only appointment to recheck Vitamin D levels (Vit D deficiency, DX: Vitamin D Deficiency).   -CRP/ESR (inflammatory markers) are both normal/negative   -Rheumatoid factor was negative.     For additional lab test information, [email or web address] is an excellent reference.     Resulted Orders   Vitamin D Deficiency   Result Value Ref Range    Vitamin D Deficiency screening 18 (L) 20 - 75 ug/L      Comment:      Season, race, dietary intake, and treatment affect the concentration of   25-hydroxy-Vitamin D. Values may decrease during winter months and increase   during summer months. Values 20-29 ug/L may indicate Vitamin D insufficiency   and values <20 ug/L may indicate Vitamin D deficiency.  Vitamin D determination is routinely performed by an immunoassay specific for   25 hydroxyvitamin D3.  If an individual is on vitamin D2 (ergocalciferol)   supplementation, please specify 25 OH vitamin D2 and D3 level determination by   LCMSMS test VITD23.     CRP, inflammation   Result Value Ref Range    CRP Inflammation <2.9 0.0 - 8.0 mg/L   ESR: Erythrocyte sedimentation rate   Result Value Ref Range    Sed Rate 13 0 - 20 mm/h   Rheumatoid factor   Result Value Ref Range    Rheumatoid Factor <20 <20 IU/mL   CBC with platelets   Result Value Ref Range    WBC 5.8 4.0 - 11.0 10e9/L    RBC Count 3.92 3.8 - 5.2 10e12/L    Hemoglobin 12.1 11.7 - 15.7 g/dL    Hematocrit 36.1 35.0 - 47.0 %    MCV 92 78 - 100 fl    MCH 30.9  26.5 - 33.0 pg    MCHC 33.5 31.5 - 36.5 g/dL    RDW 13.0 10.0 - 15.0 %    Platelet Count 174 150 - 450 10e9/L       If you have any questions or concerns, please call the clinic at the number listed above.       Sincerely,        Vipul Lamb, DO

## 2018-06-11 NOTE — PROGRESS NOTES
SUBJECTIVE:   Beatrice Cadena is a 38 year old female who presents to clinic today for the following health issues:      Concern - joint pain  Onset: 6 months     Description:   Joint pain in knees, shoulders, hands, bilateral; also has back pain    Intensity: mild, bothersome    Progression of Symptoms:  Worsening and constant - just more intense at times    Accompanying Signs & Symptoms:  None.    Previous history of similar problem:   none    Precipitating factors:   Worsened by: work as  and going up and down stairs makes it worse     Alleviating factors:  Improved by: nothing     Therapies Tried and outcome: none    Symptoms started about 6 months ago. Right knee and shoulder and knee are the worse.     When going up and down stairs, feels whole knee pain. Denies any redness, swelling, or warmth in joints. She does notice some swelling in her right knee. She has not tried any over the counter acetaminophen, ibuprofen, or naproxen. She has tried massaging the joints with minimal relief. She also endorses some numbness and tingling in her right knee and down her right leg. Occasionally feels like right knee could give out. She has not fallen. She feels like he legs are weaker.     Denies any fevers or chills.    Grandmother and aunt have history of arthritis --aunt has rheumatoid. Unsure about grandma.     Problem list and histories reviewed & adjusted, as indicated.  Additional history: as documented    Patient Active Problem List   Diagnosis     CARDIOVASCULAR SCREENING; LDL GOAL LESS THAN 160     Past Surgical History:   Procedure Laterality Date     C EYE SURG ANT SGMT PROC UNLISTED       C I&D,THYROGLOSSAL CYST,INFECTED         Social History   Substance Use Topics     Smoking status: Never Smoker     Smokeless tobacco: Never Used     Alcohol use Yes     History reviewed. No pertinent family history.        Reviewed and updated as needed this visit by clinical staff  Tobacco  Allergies   "Meds  Problems  Med Hx  Surg Hx  Fam Hx  Soc Hx        Reviewed and updated as needed this visit by Provider  Allergies  Meds  Problems         ROS:  CONSTITUTIONAL: NEGATIVE for fever, chills, change in weight  ENT/MOUTH: NEGATIVE for ear, mouth and throat problems  RESP: NEGATIVE for significant cough or SOB  CV: NEGATIVE for chest pain, palpitations or peripheral edema  MUSCULOSKELETAL:POSITIVE  for arthralgias knee, hands, shoulder    OBJECTIVE:     BP 94/60 (BP Location: Right arm, Patient Position: Chair, Cuff Size: Adult Regular)  Pulse 62  Temp 98.2  F (36.8  C) (Oral)  Ht 5' 0.5\" (1.537 m)  Wt 127 lb (57.6 kg)  SpO2 99%  BMI 24.39 kg/m2  Body mass index is 24.39 kg/(m^2).  GENERAL: healthy, alert and no distress  MS: RUE exam shows normal strength and muscle mass, no deformities, no evidence of joint effusion, ROM of all joints is normal and no evidence of joint instability, LUE exam shows normal strength and muscle mass, no deformities, no evidence of joint effusion, ROM of all joints is normal and no evidence of joint instability, RLE exam shows normal strength and muscle mass, no deformities, no evidence of joint effusion, ROM of all joints is normal and no evidence of joint instability and LLE exam shows normal strength and muscle mass, no deformities, no evidence of joint effusion, ROM of all joints is normal and no evidence of joint instability.  Bilateral shoulder: negative Neer/prince, speeds, crossover, empty can tests. No palpatory tenderness.  Bilateral knee: no appreciable effusion. Negative anterior/posterior drawer. Negative Lachman. Negative [facility]. No varus/valgus instability. Patellar reflex normal and symmetric  SKIN: no suspicious lesions or rashes  NEURO: Normal strength and tone, sensory exam grossly normal, mentation intact and DTR's normal and symmetric - patellar    Diagnostic Test Results:  none     ASSESSMENT/PLAN:   1. Pain in joint, multiple sites: largely " unremarkable exam. Symptoms may be due to overuse at work. Recommend she start treating symptomatically with Tylenol and/or ibuprofen. Also apply ice to knee or shoulder as needed. With family history of RA, will  Check rheumatoid factor and inflammatory markers. Also check vitamin D. Follow up to be determined based on lab results.  - Vitamin D Deficiency  - CRP, inflammation  - ESR: Erythrocyte sedimentation rate  - Rheumatoid factor  - CBC with platelets      Vipul Lamb,   [facility]

## 2018-06-11 NOTE — MR AVS SNAPSHOT
"              After Visit Summary   2018    Beatrice Cadena    MRN: [id number]           Patient Information     Date Of Birth          1980        Visit Information        Provider Department      2018 10:45 AM Vipul Lamb, DO; [facility]        Today's Diagnoses     Pain in joint, multiple sites    -  1       Follow-ups after your visit        Follow-up notes from your care team     Return if symptoms worsen or fail to improve.      Who to contact     If you have questions or need follow up information about today's clinic visit or your schedule please contact [facility] directly at [phone].  Normal or non-critical lab and imaging results will be communicated to you by MyChart, letter or phone within 4 business days after the clinic has received the results. If you do not hear from us within 7 days, please contact the clinic through MyChart or phone. If you have a critical or abnormal lab result, we will notify you by phone as soon as possible.  Submit refill requests through [employer] or call your pharmacy and they will forward the refill request to us. Please allow 3 business days for your refill to be completed.          Additional Information About Your Visit        MyChart Information     [employer] lets you send messages to your doctor, view your test results, renew your prescriptions, schedule appointments and more. To sign up, go to [email or web address] . Click on \"Log in\" on the left side of the screen, which will take you to the Welcome page. Then click on \"Sign up Now\" on the right side of the page.     You will be asked to enter the access code listed below, as well as some personal information. Please follow the directions to create your username and password.     Your access code is: [email or web address]  Expires: 2018 11:37 AM     Your access code will  in 90 days. If you need help or a new code, please call your [facility] " "clinic or [phone].        Care EveryWhere ID     This is your Care EveryWhere ID. This could be used by other organizations to access your [facility] medical records  [id number]        Your Vitals Were     Pulse Temperature Height Pulse Oximetry BMI (Body Mass Index)       62 98.2  F (36.8  C) (Oral) 5' 0.5\" (1.537 m) 99% 24.39 kg/m2        Blood Pressure from Last 3 Encounters:   06/11/18 94/60   03/12/18 96/64   03/02/18 90/64    Weight from Last 3 Encounters:   06/11/18 127 lb (57.6 kg)   03/12/18 132 lb (59.9 kg)   03/02/18 132 lb (59.9 kg)              We Performed the Following     CBC with platelets     CRP, inflammation     ESR: Erythrocyte sedimentation rate     Rheumatoid factor     Vitamin D Deficiency        Primary Care Provider Fax #    Physician No Ref-Primary [phone]       No address on file        Equal Access to Services     QUIRINO KEARNEY : Hadbhargav suttono Somary, waaxda luqadaha, qaybta kaalmada adenitoyaab, bishnu vences . So [facility] [phone].    ATENCIÓN: Si dianela juancho, tiene a cox disposición servicios gratuitos de asistencia lingüística. Llame al [phone].    We comply with applicable federal civil rights laws and [place] laws. We do not discriminate on the basis of race, color, national origin, age, disability, sex, sexual orientation, or gender identity.            Thank you!     Thank you for choosing [facility]  for your care. Our goal is always to provide you with excellent care. Hearing back from our patients is one way we can continue to improve our services. Please take a few minutes to complete the written survey that you may receive in the mail after your visit with us. Thank you!             Your Updated Medication List - Protect others around you: Learn how to safely use, store and throw away your medicines at www.disposemymeds.org.          This list is accurate as of 6/11/18 11:38 AM.  Always use your most recent med " list.                   Brand Name Dispense Instructions for use Diagnosis    ketoconazole 2 % cream    NIZORAL    60 g    Apply topically daily Apply to feet daily    Onychomycosis of toenail, Tinea pedis of both feet

## 2018-06-12 LAB
DEPRECATED CALCIDIOL+CALCIFEROL SERPL-MC: 18 UG/L (ref 20–75)
RHEUMATOID FACT SER NEPH-ACNC: <20 IU/ML (ref 0–20)

## 2018-06-14 DIAGNOSIS — E55.9 VITAMIN D DEFICIENCY: Primary | ICD-10-CM

## 2018-06-21 ENCOUNTER — OFFICE VISIT (OUTPATIENT)
Dept: FAMILY MEDICINE | Facility: CLINIC | Age: 38
End: 2018-06-21
Payer: COMMERCIAL

## 2018-06-21 VITALS
OXYGEN SATURATION: 99 % | HEART RATE: 76 BPM | DIASTOLIC BLOOD PRESSURE: 55 MMHG | BODY MASS INDEX: 24.68 KG/M2 | SYSTOLIC BLOOD PRESSURE: 90 MMHG | WEIGHT: 128.5 LBS | TEMPERATURE: 98.9 F

## 2018-06-21 DIAGNOSIS — R09.82 POST-NASAL DRIP: Primary | ICD-10-CM

## 2018-06-21 DIAGNOSIS — R07.0 THROAT PAIN: ICD-10-CM

## 2018-06-21 LAB
DEPRECATED S PYO AG THROAT QL EIA: NORMAL
SPECIMEN SOURCE: NORMAL

## 2018-06-21 PROCEDURE — 84443 ASSAY THYROID STIM HORMONE: CPT | Performed by: FAMILY MEDICINE

## 2018-06-21 PROCEDURE — 99213 OFFICE O/P EST LOW 20 MIN: CPT | Performed by: FAMILY MEDICINE

## 2018-06-21 PROCEDURE — 36415 COLL VENOUS BLD VENIPUNCTURE: CPT | Performed by: FAMILY MEDICINE

## 2018-06-21 PROCEDURE — 87081 CULTURE SCREEN ONLY: CPT | Performed by: FAMILY MEDICINE

## 2018-06-21 PROCEDURE — 87880 STREP A ASSAY W/OPTIC: CPT | Performed by: FAMILY MEDICINE

## 2018-06-21 RX ORDER — FEXOFENADINE HCL AND PSEUDOEPHEDRINE HCL 180; 240 MG/1; MG/1
1 TABLET, EXTENDED RELEASE ORAL DAILY
COMMUNITY
End: 2018-06-21

## 2018-06-21 RX ORDER — FEXOFENADINE HCL AND PSEUDOEPHEDRINE HCI 60; 120 MG/1; MG/1
1 TABLET, EXTENDED RELEASE ORAL 2 TIMES DAILY
Qty: 30 TABLET | Refills: 2 | Status: SHIPPED | OUTPATIENT
Start: 2018-06-21

## 2018-06-21 NOTE — PROGRESS NOTES
SUBJECTIVE:   Beatrice Cadena is a 38 year old female who presents to clinic today for the following health issues:      Acute Illness   Acute illness concerns: Sore throat  Onset: x1 month ago    Fever: no    Chills/Sweats: no    Headache (location?): no    Sinus Pressure:no    Conjunctivitis:  no    Ear Pain: no    Rhinorrhea: no    Congestion: YES    Sore Throat: YES     Cough: YES-feels like she's coughing up bubbles    Wheeze: YES- little bit    Decreased Appetite: no     Nausea: no    Vomiting: no    Diarrhea:  no    Dysuria/Freq.: no    Fatigue/Achiness: YES    Sick/Strep Exposure: no     Therapies Tried and outcome: None    ROS:  General, neuro, sleep, psych, musculoskeletal system otherwise negative.     BP 90/55 (BP Location: Right arm, Patient Position: Sitting, Cuff Size: Adult Regular)  Pulse 76  Temp 98.9  F (37.2  C) (Oral)  Wt 128 lb 8 oz (58.3 kg)  SpO2 99%  BMI 24.68 kg/m2    GENERAL: healthy, alert and no distress  EYES: Eyes grossly normal to inspection, PERRL and conjunctivae and sclerae normal  HENT: ear canals and TM's normal, nose and mouth without ulcers or lesions  NECK: no adenopathy, no asymmetry, masses, or scars and thyroid normal to palpation  RESP: lungs clear to auscultation - no rales, rhonchi or wheezes  CV: regular rate and rhythm, normal S1 S2, no S3 or S4, no murmur, click or rub, no peripheral edema and peripheral pulses strong  MS: no gross musculoskeletal defects noted, no edema  SKIN:healed surgical scar mid neck anteriorly  NEURO: Normal strength and tone, mentation intact and speech normal  PSYCH: mentation appears normal, affect normal/bright    ASSESSMENT:  1. Post-nasal drip  2. Throat pain  Pt instructed to come back to the clinic for worsening sx  Consider PND as cause.   Repeat thyroid testing since had surgery in the neck in the past potentially of thyroid origin.  - Strep, Rapid Screen  - TSH with free T4 reflex  - Beta strep group A culture  -  fexofenadine-pseudoePHEDrine (ALLEGRA-D)  MG per 12 hr tablet; Take 1 tablet by mouth 2 times daily  Dispense: 30 tablet; Refill: 2

## 2018-06-21 NOTE — MR AVS SNAPSHOT
"              After Visit Summary   2018    Beatrice Cadena    MRN: [id number]           Patient Information     Date Of Birth          1980        Visit Information        Provider Department      2018 2:25 PM Fernando Cueva MD; PHONE,  [facility]        Today's Diagnoses     Post-nasal drip    -  1    Throat pain           Follow-ups after your visit        Follow-up notes from your care team     Return if symptoms worsen or fail to improve.      Who to contact     If you have questions or need follow up information about today's clinic visit or your schedule please contact [facility] directly at [phone].  Normal or non-critical lab and imaging results will be communicated to you by MyChart, letter or phone within 4 business days after the clinic has received the results. If you do not hear from us within 7 days, please contact the clinic through MyChart or phone. If you have a critical or abnormal lab result, we will notify you by phone as soon as possible.  Submit refill requests through [employer] or call your pharmacy and they will forward the refill request to us. Please allow 3 business days for your refill to be completed.          Additional Information About Your Visit        MyChart Information     Science lets you send messages to your doctor, view your test results, renew your prescriptions, schedule appointments and more. To sign up, go to [email or web address] . Click on \"Log in\" on the left side of the screen, which will take you to the Welcome page. Then click on \"Sign up Now\" on the right side of the page.     You will be asked to enter the access code listed below, as well as some personal information. Please follow the directions to create your username and password.     Your access code is: [email or web address]  Expires: 2018 11:37 AM     Your access code will  in 90 days. If you need help or a new code, please call your " [facility] or [phone].        Care EveryWhere ID     This is your Care EveryWhere ID. This could be used by other organizations to access your [facility] medical records  [id number]        Your Vitals Were     Pulse Temperature Pulse Oximetry BMI (Body Mass Index)          76 98.9  F (37.2  C) (Oral) 99% 24.68 kg/m2         Blood Pressure from Last 3 Encounters:   06/21/18 90/55   06/11/18 94/60   03/12/18 96/64    Weight from Last 3 Encounters:   06/21/18 128 lb 8 oz (58.3 kg)   06/11/18 127 lb (57.6 kg)   03/12/18 132 lb (59.9 kg)              We Performed the Following     Beta strep group A culture     Strep, Rapid Screen     TSH with free T4 reflex          Today's Medication Changes          These changes are accurate as of 6/21/18  3:15 PM.  If you have any questions, ask your nurse or doctor.               Start taking these medicines.        Dose/Directions    fexofenadine-pseudoePHEDrine  MG per 12 hr tablet   Commonly known as:  ALLEGRA-D   Used for:  Post-nasal drip   Started by:  Fernando Cueva MD        Dose:  1 tablet   Take 1 tablet by mouth 2 times daily   Quantity:  30 tablet   Refills:  2            Where to get your medicines      Some of these will need a paper prescription and others can be bought over the counter.  Ask your nurse if you have questions.     Bring a paper prescription for each of these medications     fexofenadine-pseudoePHEDrine  MG per 12 hr tablet                Primary Care Provider Fax #    Physician No Ref-Primary [phone]       No address on file        Equal Access to Services     [facility] : Hadii russell mcdermott Somary, wacalderonda luqadaha, qaybta kaalmaarthur reillyFranklin County Memorial Hospitaldiane saleh. So [facility] [phone].    ATENCIÓN: Si habla español, tiene a cox disposición servicios gratuitos de asistencia lingüística. Llame al [phone].    We comply with applicable federal civil rights laws and [place] laws. We do not  discriminate on the basis of race, color, national origin, age, disability, sex, sexual orientation, or gender identity.            Thank you!     Thank you for choosing [facility]  for your care. Our goal is always to provide you with excellent care. Hearing back from our patients is one way we can continue to improve our services. Please take a few minutes to complete the written survey that you may receive in the mail after your visit with us. Thank you!             Your Updated Medication List - Protect others around you: Learn how to safely use, store and throw away your medicines at www.disposemymeds.org.          This list is accurate as of 6/21/18  3:15 PM.  Always use your most recent med list.                   Brand Name Dispense Instructions for use Diagnosis    fexofenadine-pseudoePHEDrine  MG per 12 hr tablet    ALLEGRA-D    30 tablet    Take 1 tablet by mouth 2 times daily    Post-nasal drip

## 2018-06-22 LAB
BACTERIA SPEC CULT: NORMAL
SPECIMEN SOURCE: NORMAL
TSH SERPL DL<=0.005 MIU/L-ACNC: 0.58 MU/L (ref 0.4–4)

## 2018-09-21 ENCOUNTER — TELEPHONE (OUTPATIENT)
Dept: FAMILY MEDICINE | Facility: CLINIC | Age: 38
End: 2018-09-21

## 2018-09-21 NOTE — LETTER
[facility]          [address] [place] [address]                                                                                                       [phone]  September 21, 2018    Beatrice Cadena  [address]   [facility] [address]      Dear Beatrice,    A review of your record shows that you are due for the following health maintenance exam(s):    -Pap Smear- a screening test done during a pelvic exam to check for abnormal changes in the cells of the cervix. The cervix is the lower part of the uterus that opens into the vagina. Abnormal cells can develop into cancer if not detected and treated. There are no signs or symptoms related to early cervical cancer so a pelvic exam of the female sex organs and a Pap test are needed. Cervical cancer is preventable and curable if abnormal cells are detected and treated early. Pap tests have reduced deaths from cancer of the cervix in the [place] by 70% over the past 50 years.  Please call to arrange this for you or you can also schedule this through [employer] (online medical record access).  Please disregard this reminder if you have already scheduled or have had this exam elsewhere within the last year.  It would be helpful for us to have a copy of your pap smear report in our file so that we can best coordinate your care.     In addition, if we see you for your annual health care maintenance exam (complete physical), and it has been over a year since your last exam, then please schedule that as well.    We now have OB/GYN providers on site! You may schedule with either Taryn ROLLINS CNM or Dr. Nikki Arrieta at [phone].    Thank you very much for choosing [facility].   We appreciate the opportunity to serve you and look forward to supporting your healthcare needs in the future.

## 2018-09-21 NOTE — TELEPHONE ENCOUNTER
Needs of attention regarding:  -Cervical Cancer Screening    Health Maintenance Topics with due status: Overdue       Topic Date Due    HIV SCREEN (SYSTEM ASSIGNED) 03/07/1999    PAP SCREENING Q3 YR (SYSTEM ASSIGNED) 05/12/2012    INFLUENZA VACCINE 09/01/2018       Communication:  See Letter